# Patient Record
Sex: MALE | HISPANIC OR LATINO | Employment: STUDENT | URBAN - METROPOLITAN AREA
[De-identification: names, ages, dates, MRNs, and addresses within clinical notes are randomized per-mention and may not be internally consistent; named-entity substitution may affect disease eponyms.]

---

## 2017-01-31 PROBLEM — M25.569 KNEE PAIN: Status: ACTIVE | Noted: 2017-01-31

## 2017-01-31 PROBLEM — F90.9 ADULT ATTENTION DEFICIT HYPERACTIVITY DISORDER: Status: ACTIVE | Noted: 2017-01-31

## 2017-01-31 PROBLEM — F41.9 ANXIETY: Status: ACTIVE | Noted: 2017-01-31

## 2017-01-31 PROBLEM — G43.909 HEADACHE, MIGRAINE: Status: ACTIVE | Noted: 2017-01-31

## 2017-01-31 PROBLEM — F32.A DEPRESSION: Status: ACTIVE | Noted: 2017-01-31

## 2017-02-02 ENCOUNTER — OFFICE VISIT (OUTPATIENT)
Dept: INTERNAL MEDICINE | Facility: CLINIC | Age: 22
End: 2017-02-02

## 2017-02-02 VITALS
TEMPERATURE: 98.5 F | DIASTOLIC BLOOD PRESSURE: 78 MMHG | OXYGEN SATURATION: 98 % | HEIGHT: 66 IN | HEART RATE: 61 BPM | SYSTOLIC BLOOD PRESSURE: 118 MMHG | BODY MASS INDEX: 28.03 KG/M2 | WEIGHT: 174.4 LBS

## 2017-02-02 DIAGNOSIS — S43.004A SHOULDER DISLOCATION, RIGHT, INITIAL ENCOUNTER: Primary | ICD-10-CM

## 2017-02-02 PROCEDURE — 99213 OFFICE O/P EST LOW 20 MIN: CPT | Performed by: FAMILY MEDICINE

## 2017-02-02 NOTE — PROGRESS NOTES
"Manuel Baltazar is a 21 y.o. male.     Chief Complaint   Patient presents with   • Shoulder Pain     Right shoulder injury slipped upstairs and dislocated shoulder then popped back in lace.  2 months ago.  Usually hurts in morning when waking up it subluxes       Vitals:    02/02/17 0806   BP: 118/78   Pulse: 61   Temp: 98.5 °F (36.9 °C)   SpO2: 98%   Weight: 174 lb 6.4 oz (79.1 kg)   Height: 66\" (167.6 cm)       Shoulder Injury    The incident occurred at school (stairs outside at ). The right shoulder is affected. The incident occurred more than 1 week ago (2 months ago). The injury mechanism was a fall. The quality of the pain is described as aching and shooting. The pain does not radiate. The pain is at a severity of 8/10 (2 when not dislocated and 8 when dislocated. ). The pain is moderate. Pertinent negatives include no chest pain, muscle weakness, numbness or tingling. The symptoms are aggravated by overhead lifting. He has tried ice and rest for the symptoms. The treatment provided mild relief.      Pt reports that he had bucket handle tear of left knee 2 years ago. He had surgery on knee which is doing well.  Pt was climbing up stairs and slipped and caught himself and dislocated and relocated right shoulder. Pt had labral and biceps repair on the right shoulder in the past 2011 in [place].   Pt is unable to do overhead work. Pt states that he will sleep with arms overhead and right shoulder will sublux.      The following portions of the patient's history were reviewed and updated as appropriate: allergies, current medications, past family history, past medical history, past social history, past surgical history and problem list.    Past Medical History   Diagnosis Date   • ADHD (attention deficit hyperactivity disorder)    • Anesthesia complication 2015     wakes up early and combative   • Anxiety    • Chronic headaches    • Depression    • H/O complete eye exam 04/2012   • History of " dental examination    • Knee pain        Past Surgical History   Procedure Laterality Date   • Knee surgery Left 2015     summer of 2015-meniscus (bucket tear)   • Shoulder surgery Right 2011     labral SLAP and biceps repair   • [place] tooth extraction         Family History   Problem Relation Age of Onset   • Thyroid disease Mother    • ADD / ADHD Father    • ADD / ADHD Brother    • Diabetes Maternal Grandfather    • Diabetes Paternal Grandfather        Social History     Social History   • Marital status: Single     Spouse name: N/A   • Number of children: N/A   • Years of education: N/A     Occupational History   • Not on file.     Social History Main Topics   • Smoking status: Never Smoker   • Smokeless tobacco: Never Used   • Alcohol use Yes      Comment: rarely--special occasion   • Drug use: No   • Sexual activity: Yes     Birth control/ protection: None     Other Topics Concern   • Not on file     Social History Narrative   • No narrative on file     Pt has allergy to unknown medication that cause whelps on arm.Pt has records at home. This was done at .   Pt wakes up agitated from anesthesia.   Review of Systems   Constitutional: Negative.  Negative for chills, diaphoresis, fatigue and fever.   HENT: Negative.  Negative for ear pain, nosebleeds, postnasal drip, rhinorrhea, sinus pressure, sneezing and sore throat.    Eyes: Negative.  Negative for redness and itching.   Respiratory: Negative.  Negative for cough and wheezing.    Cardiovascular: Negative.  Negative for chest pain and palpitations.   Gastrointestinal: Negative.  Negative for abdominal pain, blood in stool, constipation, diarrhea, nausea and vomiting.   Endocrine: Negative.  Negative for cold intolerance and heat intolerance.   Genitourinary: Negative.  Negative for dysuria, frequency and urgency.   Musculoskeletal: Positive for arthralgias. Negative for back pain and neck pain.   Skin: Negative.  Negative for color change and rash.    Allergic/Immunologic: Negative.  Negative for environmental allergies.   Neurological: Negative.  Negative for dizziness, tingling, syncope, weakness, numbness and headaches.   Hematological: Negative.  Negative for adenopathy. Does not bruise/bleed easily.   Psychiatric/Behavioral: Negative.  Negative for dysphoric mood. The patient is not nervous/anxious.         ADHD is currently stable off of meds.       Objective   Physical Exam   Constitutional: He is oriented to person, place, and time. He appears well-developed.   HENT:   Head: Normocephalic.   Right Ear: External ear normal.   Left Ear: External ear normal.   Nose: Nose normal.   Mouth/Throat: Oropharynx is clear and moist.   Eyes: Conjunctivae and EOM are normal. Pupils are equal, round, and reactive to light.   Neck: Normal range of motion. Neck supple.   Cardiovascular: Normal rate and regular rhythm.    No murmur heard.  Pulmonary/Chest: Effort normal and breath sounds normal.   Abdominal: Soft. Bowel sounds are normal.   Musculoskeletal:        Right shoulder: He exhibits decreased range of motion, tenderness, crepitus and decreased strength.        Arms:  Neurological: He is alert and oriented to person, place, and time.   Skin: Skin is warm and dry.   Psychiatric: He has a normal mood and affect. His behavior is normal.   Nursing note and vitals reviewed.      Assessment/Plan   Lizzy was seen today for shoulder pain.    Diagnoses and all orders for this visit:    Shoulder dislocation, right, initial encounter  -     Ambulatory Referral to Orthopedic Surgery  -     XR Shoulder 2+ View Right; Future      Pt declines flu shot           No current outpatient prescriptions on file.    Return in about 4 weeks (around 3/2/2017), or if symptoms worsen or fail to improve, for Recheck.

## 2017-03-09 ENCOUNTER — OFFICE VISIT (OUTPATIENT)
Dept: INTERNAL MEDICINE | Facility: CLINIC | Age: 22
End: 2017-03-09

## 2017-03-09 VITALS
BODY MASS INDEX: 28.77 KG/M2 | SYSTOLIC BLOOD PRESSURE: 116 MMHG | DIASTOLIC BLOOD PRESSURE: 76 MMHG | HEART RATE: 63 BPM | HEIGHT: 66 IN | OXYGEN SATURATION: 98 % | WEIGHT: 179 LBS | TEMPERATURE: 97.6 F

## 2017-03-09 DIAGNOSIS — M54.6 CHRONIC BILATERAL THORACIC BACK PAIN: Primary | ICD-10-CM

## 2017-03-09 DIAGNOSIS — G89.29 CHRONIC BILATERAL THORACIC BACK PAIN: Primary | ICD-10-CM

## 2017-03-09 DIAGNOSIS — S49.91XD RIGHT SHOULDER INJURY, SUBSEQUENT ENCOUNTER: ICD-10-CM

## 2017-03-09 PROCEDURE — 99213 OFFICE O/P EST LOW 20 MIN: CPT | Performed by: FAMILY MEDICINE

## 2017-03-09 NOTE — PROGRESS NOTES
"Manuel Baltazar is a 22 y.o. male.     Chief Complaint   Patient presents with   • dislocated shoulder     has surgery scheduled at [facility] on 4/10/17       Vitals:    03/09/17 0832   BP: 116/76   Pulse: 63   Temp: 97.6 °F (36.4 °C)   SpO2: 98%   Weight: 179 lb (81.2 kg)   Height: 66\" (167.6 cm)       Shoulder Injury    The incident occurred at school. The right shoulder is affected. Incident onset: 12/2016. The injury mechanism was a fall (with anterior dislocation). The quality of the pain is described as aching. The pain does not radiate. The pain is at a severity of 3/10. The pain is mild. Associated symptoms include muscle weakness. Pertinent negatives include no chest pain, numbness or tingling. The symptoms are aggravated by overhead lifting, movement and palpation. He has tried non-weight bearing and rest for the symptoms. The treatment provided no relief.   Back Pain   This is a chronic problem. The current episode started more than 1 month ago. The problem occurs constantly. The problem is unchanged. The pain is present in the thoracic spine. The quality of the pain is described as aching. The pain does not radiate. The pain is at a severity of 4/10. The pain is moderate. The pain is worse during the night. The symptoms are aggravated by bending and sitting. Pertinent negatives include no abdominal pain, bladder incontinence, bowel incontinence, chest pain, dysuria, fever, headaches, leg pain, numbness, paresis, paresthesias, pelvic pain, perianal numbness, tingling, weakness or weight loss. Risk factors include recent trauma. Treatments tried: rest. The treatment provided mild relief.      Pt here for f/u of right shoulder dislocation with slap tear.  Pt will have surgery 4/10/17 at [facility]. Pt has seen Ortho at   Pt would like back checked, he has a hump in back that is sore. Pt has had for several months.   Pt has been rolling his shoulders forward for several months. "     The following portions of the patient's history were reviewed and updated as appropriate: allergies, current medications, past family history, past medical history, past social history, past surgical history and problem list.    Past Medical History   Diagnosis Date   • ADHD (attention deficit hyperactivity disorder)    • Anesthesia complication 2015     wakes up early and combative   • Anxiety    • Chronic headaches    • Depression    • H/O complete eye exam 04/2012   • History of dental examination    • Knee pain        Past Surgical History   Procedure Laterality Date   • Knee surgery Left 2015     summer of 2015-meniscus (bucket tear)   • Shoulder surgery Right 2011     labral SLAP and biceps repair   • [place] tooth extraction         Allergies   Allergen Reactions   • Demerol [Meperidine]        Social History     Social History   • Marital status: Single     Spouse name: N/A   • Number of children: N/A   • Years of education: N/A     Occupational History   • Not on file.     Social History Main Topics   • Smoking status: Never Smoker   • Smokeless tobacco: Never Used   • Alcohol use Yes      Comment: rarely--special occasion   • Drug use: No   • Sexual activity: Yes     Birth control/ protection: None     Other Topics Concern   • Not on file     Social History Narrative       Family History   Problem Relation Age of Onset   • Thyroid disease Mother    • ADD / ADHD Father    • ADD / ADHD Brother    • Diabetes Maternal Grandfather    • Diabetes Paternal Grandfather        Review of Systems   Constitutional: Negative.  Negative for chills, diaphoresis, fatigue, fever and weight loss.   HENT: Negative.  Negative for ear pain, postnasal drip, rhinorrhea, sinus pressure, sneezing and sore throat.    Eyes: Negative.  Negative for redness and itching.   Respiratory: Negative.  Negative for cough, shortness of breath and wheezing.    Cardiovascular: Negative.  Negative for chest pain and palpitations.    Gastrointestinal: Negative.  Negative for abdominal pain, bowel incontinence, constipation, diarrhea, nausea and vomiting.   Endocrine: Negative.  Negative for cold intolerance and heat intolerance.   Genitourinary: Negative.  Negative for bladder incontinence, dysuria, frequency, pelvic pain and urgency.   Musculoskeletal: Positive for arthralgias, back pain and myalgias.   Skin: Negative.  Negative for color change and rash.   Allergic/Immunologic: Negative for environmental allergies.   Neurological: Negative.  Negative for dizziness, tingling, weakness, light-headedness, numbness, headaches and paresthesias.   Hematological: Negative for adenopathy. Does not bruise/bleed easily.   Psychiatric/Behavioral: Negative for dysphoric mood. The patient is not nervous/anxious.        Objective   Physical Exam   Constitutional: He is oriented to person, place, and time. He appears well-developed.   HENT:   Head: Normocephalic.   Right Ear: External ear normal.   Left Ear: External ear normal.   Nose: Nose normal.   Eyes: Conjunctivae and EOM are normal. Pupils are equal, round, and reactive to light.   Neck: Normal range of motion. Neck supple.   Cardiovascular: Normal rate and regular rhythm.    No murmur heard.  Pulmonary/Chest: Effort normal and breath sounds normal.   Musculoskeletal: He exhibits tenderness.        Right shoulder: He exhibits decreased range of motion and tenderness.        Thoracic back: He exhibits tenderness and spasm.        Back:    Neurological: He is alert and oriented to person, place, and time.   Skin: Skin is warm and dry.   Psychiatric: He has a normal mood and affect. His behavior is normal.   Nursing note and vitals reviewed.      Assessment/Plan   Lizzy was seen today for dislocated shoulder.    Diagnoses and all orders for this visit:    Chronic bilateral thoracic back pain  -     XR Spine Thoracic 3 View    Right shoulder injury, subsequent encounter      Pt declined pain  medication         No current outpatient prescriptions on file.    Return in about 6 weeks (around 4/20/2017), or if symptoms worsen or fail to improve, for Recheck.

## 2017-04-25 ENCOUNTER — OFFICE VISIT (OUTPATIENT)
Dept: INTERNAL MEDICINE | Facility: CLINIC | Age: 22
End: 2017-04-25

## 2017-04-25 VITALS
HEIGHT: 66 IN | WEIGHT: 182.2 LBS | HEART RATE: 54 BPM | SYSTOLIC BLOOD PRESSURE: 110 MMHG | TEMPERATURE: 98.3 F | OXYGEN SATURATION: 98 % | DIASTOLIC BLOOD PRESSURE: 74 MMHG | BODY MASS INDEX: 29.28 KG/M2

## 2017-04-25 DIAGNOSIS — G89.29 CHRONIC BILATERAL THORACIC BACK PAIN: Primary | ICD-10-CM

## 2017-04-25 DIAGNOSIS — M54.6 CHRONIC BILATERAL THORACIC BACK PAIN: Primary | ICD-10-CM

## 2017-04-25 PROCEDURE — 99213 OFFICE O/P EST LOW 20 MIN: CPT | Performed by: FAMILY MEDICINE

## 2017-04-25 RX ORDER — OXYCODONE HYDROCHLORIDE AND ACETAMINOPHEN 5; 325 MG/1; MG/1
TABLET ORAL
COMMUNITY
Start: 2017-04-19 | End: 2018-07-20

## 2017-04-25 NOTE — PROGRESS NOTES
"Manuel Baltazar is a 22 y.o. male.     Chief Complaint   Patient presents with   • Shoulder Injury     6 week follow up had surgery 4/19.  Dr George   • Back Pain       Visit Vitals   • /74   • Pulse 54   • Temp 98.3 °F (36.8 °C)   • Ht 66\" (167.6 cm)   • Wt 182 lb 3.2 oz (82.6 kg)   • SpO2 98%   • BMI 29.41 kg/m2       Back Pain   This is a recurrent problem. The current episode started more than 1 year ago. The problem occurs intermittently. The problem is unchanged. The pain is present in the thoracic spine. The quality of the pain is described as cramping and shooting. The pain does not radiate. The pain is at a severity of 4/10. The pain is mild. The pain is worse during the night. The symptoms are aggravated by sitting. Pertinent negatives include no abdominal pain, bladder incontinence, bowel incontinence, chest pain, dysuria, fever, headaches, leg pain, numbness, paresis, paresthesias, pelvic pain, perianal numbness, tingling, weakness or weight loss. Risk factors: recent surgery. He has tried analgesics for the symptoms.        Pt had shoulder surgery 4/19/17 on the right- labral repair. Pt is in sling with offset pad on abdomen.     Pt's upper back is tight. Pt has not gotten back xray.  Pt did well post anesthesia.   Pt will be in sling for another month then PT for right shoulder.   Shoulder -right feels better than before the surgery.   The following portions of the patient's history were reviewed and updated as appropriate: allergies, current medications, past family history, past medical history, past social history, past surgical history and problem list.     Past Medical History:   Diagnosis Date   • ADHD (attention deficit hyperactivity disorder)    • Anesthesia complication 2015    wakes up early and combative   • Anxiety    • Chronic headaches    • Depression    • H/O complete eye exam 04/2012   • History of dental examination    • Knee pain        Past Surgical History: "   Procedure Laterality Date   • KNEE SURGERY Left 2015    summer of 2015-meniscus (bucket tear)   • SHOULDER SURGERY Right 2011    labral SLAP and biceps repair   • SHOULDER SURGERY Right 04/19/2017    labral repair   • WISDOM TOOTH EXTRACTION         Allergies   Allergen Reactions   • Demerol [Meperidine]        Family History   Problem Relation Age of Onset   • Thyroid disease Mother    • ADD / ADHD Father    • ADD / ADHD Brother    • Diabetes Maternal Grandfather    • Diabetes Paternal Grandfather        Social History     Social History   • Marital status: Single     Spouse name: N/A   • Number of children: N/A   • Years of education: N/A     Occupational History   • Not on file.     Social History Main Topics   • Smoking status: Never Smoker   • Smokeless tobacco: Never Used   • Alcohol use Yes      Comment: rarely--special occasion   • Drug use: No   • Sexual activity: Yes     Birth control/ protection: None     Other Topics Concern   • Not on file     Social History Narrative         Review of Systems   Constitutional: Negative.  Negative for chills, diaphoresis, fatigue, fever and weight loss.   HENT: Negative.  Negative for ear pain, postnasal drip, rhinorrhea, sinus pressure, sneezing, sore throat and tinnitus.    Eyes: Negative.  Negative for redness and itching.   Respiratory: Negative.  Negative for cough, shortness of breath and wheezing.    Cardiovascular: Negative.  Negative for chest pain and palpitations.   Gastrointestinal: Negative.  Negative for abdominal pain, blood in stool, bowel incontinence, constipation, diarrhea, nausea and vomiting.   Endocrine: Negative.  Negative for cold intolerance and heat intolerance.   Genitourinary: Negative.  Negative for bladder incontinence, dysuria, frequency, pelvic pain and urgency.   Musculoskeletal: Positive for arthralgias and back pain.   Skin: Negative.  Negative for color change and rash.   Allergic/Immunologic: Negative.  Negative for environmental  allergies.   Neurological: Negative.  Negative for dizziness, tingling, weakness, light-headedness, numbness, headaches and paresthesias.   Hematological: Negative.  Negative for adenopathy. Does not bruise/bleed easily.   Psychiatric/Behavioral: Negative.  Negative for dysphoric mood. The patient is not nervous/anxious.        Objective   Physical Exam   Constitutional: He is oriented to person, place, and time. He appears well-developed.   HENT:   Head: Normocephalic.   Right Ear: External ear normal.   Left Ear: External ear normal.   Nose: Nose normal.   Eyes: Conjunctivae and EOM are normal. Pupils are equal, round, and reactive to light.   Neck: Normal range of motion. Neck supple.   Cardiovascular: Normal rate and regular rhythm.    No murmur heard.  Pulmonary/Chest: Effort normal and breath sounds normal.   Musculoskeletal: Normal range of motion.        Thoracic back: He exhibits tenderness and spasm. He exhibits no bony tenderness.   Right arm/shoulder in sling.   Neurological: He is alert and oriented to person, place, and time.   Skin: Skin is warm and dry.   Psychiatric: He has a normal mood and affect. His behavior is normal.   Nursing note and vitals reviewed.      Assessment/Plan   Lizzy was seen today for shoulder injury and back pain.    Diagnoses and all orders for this visit:    Chronic bilateral thoracic back pain  -     XR Spine Thoracic 3 View        Pt to call when ready for PT, will do at same facility that he will be getting PT for shoulder  Explained to patient that xray orders are in the computer. If pt going to a [facility] facility for plain films, a order slip and scheduling are not needed. If going to an outside facility an order slip is needed and some facilities require scheduling for plain films.            Current Outpatient Prescriptions:   •  oxyCODONE-acetaminophen (PERCOCET) 5-325 MG per tablet, , Disp: , Rfl:     Return in about 2 months (around 6/25/2017), or if symptoms  worsen or fail to improve, for Recheck.

## 2017-05-09 ENCOUNTER — HOSPITAL ENCOUNTER (OUTPATIENT)
Dept: GENERAL RADIOLOGY | Facility: HOSPITAL | Age: 22
Discharge: HOME | End: 2017-05-09
Admitting: FAMILY MEDICINE

## 2017-05-09 PROCEDURE — 72072 X-RAY EXAM THORAC SPINE 3VWS: CPT

## 2018-04-14 ENCOUNTER — LAB REQUISITION (OUTPATIENT)
Dept: LAB | Facility: HOSPITAL | Age: 23
End: 2018-04-14

## 2018-04-14 DIAGNOSIS — Z00.00 ROUTINE GENERAL MEDICAL EXAMINATION AT A HEALTH CARE FACILITY: ICD-10-CM

## 2018-04-14 LAB
ALBUMIN SERPL-MCNC: 5 G/DL (ref 3.2–4.8)
ALBUMIN/GLOB SERPL: 2.4 G/DL (ref 1.5–2.5)
ALP SERPL-CCNC: 66 U/L (ref 25–100)
ALT SERPL W P-5'-P-CCNC: 5 U/L (ref 7–40)
ANION GAP SERPL CALCULATED.3IONS-SCNC: 14 MMOL/L (ref 3–11)
ARTICHOKE IGE QN: 82 MG/DL (ref 0–130)
AST SERPL-CCNC: 21 U/L (ref 0–33)
BASOPHILS # BLD AUTO: 0.03 10*3/MM3 (ref 0–0.2)
BASOPHILS NFR BLD AUTO: 0.5 % (ref 0–1)
BILIRUB SERPL-MCNC: 0.9 MG/DL (ref 0.3–1.2)
BUN BLD-MCNC: 13 MG/DL (ref 9–23)
BUN/CREAT SERPL: 11.8 (ref 7–25)
CALCIUM SPEC-SCNC: 9.9 MG/DL (ref 8.7–10.4)
CHLORIDE SERPL-SCNC: 101 MMOL/L (ref 99–109)
CHOLEST SERPL-MCNC: 141 MG/DL (ref 0–200)
CO2 SERPL-SCNC: 28 MMOL/L (ref 20–31)
CREAT BLD-MCNC: 1.1 MG/DL (ref 0.6–1.3)
DEPRECATED RDW RBC AUTO: 37.9 FL (ref 37–54)
EOSINOPHIL # BLD AUTO: 0.08 10*3/MM3 (ref 0–0.3)
EOSINOPHIL NFR BLD AUTO: 1.5 % (ref 0–3)
ERYTHROCYTE [DISTWIDTH] IN BLOOD BY AUTOMATED COUNT: 11.9 % (ref 11.3–14.5)
GFR SERPL CREATININE-BSD FRML MDRD: 82 ML/MIN/1.73
GFR SERPL CREATININE-BSD FRML MDRD: 99 ML/MIN/1.73
GLOBULIN UR ELPH-MCNC: 2.1 GM/DL
GLUCOSE BLD-MCNC: 88 MG/DL (ref 70–100)
HCT VFR BLD AUTO: 43.3 % (ref 38.9–50.9)
HDLC SERPL-MCNC: 46 MG/DL (ref 40–60)
HGB BLD-MCNC: 14.4 G/DL (ref 13.1–17.5)
IMM GRANULOCYTES # BLD: 0.01 10*3/MM3 (ref 0–0.03)
IMM GRANULOCYTES NFR BLD: 0.2 % (ref 0–0.6)
LYMPHOCYTES # BLD AUTO: 1.06 10*3/MM3 (ref 0.6–4.8)
LYMPHOCYTES NFR BLD AUTO: 19.3 % (ref 24–44)
MCH RBC QN AUTO: 28.9 PG (ref 27–31)
MCHC RBC AUTO-ENTMCNC: 33.3 G/DL (ref 32–36)
MCV RBC AUTO: 86.9 FL (ref 80–99)
MONOCYTES # BLD AUTO: 0.61 10*3/MM3 (ref 0–1)
MONOCYTES NFR BLD AUTO: 11.1 % (ref 0–12)
NEUTROPHILS # BLD AUTO: 3.69 10*3/MM3 (ref 1.5–8.3)
NEUTROPHILS NFR BLD AUTO: 67.4 % (ref 41–71)
PLATELET # BLD AUTO: 202 10*3/MM3 (ref 150–450)
PMV BLD AUTO: 10.6 FL (ref 6–12)
POTASSIUM BLD-SCNC: 3.9 MMOL/L (ref 3.5–5.5)
PROT SERPL-MCNC: 7.1 G/DL (ref 5.7–8.2)
RBC # BLD AUTO: 4.98 10*6/MM3 (ref 4.2–5.76)
SODIUM BLD-SCNC: 143 MMOL/L (ref 132–146)
TRIGL SERPL-MCNC: 80 MG/DL (ref 0–150)
WBC NRBC COR # BLD: 5.48 10*3/MM3 (ref 3.5–10.8)

## 2018-04-14 PROCEDURE — 85025 COMPLETE CBC W/AUTO DIFF WBC: CPT

## 2018-04-14 PROCEDURE — 80061 LIPID PANEL: CPT

## 2018-04-14 PROCEDURE — 80053 COMPREHEN METABOLIC PANEL: CPT

## 2018-06-04 ENCOUNTER — TELEPHONE (OUTPATIENT)
Dept: INTERNAL MEDICINE | Facility: CLINIC | Age: 23
End: 2018-06-04

## 2018-06-04 NOTE — TELEPHONE ENCOUNTER
Delaney says that he had surgery on his shoulder last year, he believes that he has injured it again, he would like a MRI done.please call to let him know what he needs to do.

## 2018-06-06 ENCOUNTER — OFFICE VISIT (OUTPATIENT)
Dept: INTERNAL MEDICINE | Facility: CLINIC | Age: 23
End: 2018-06-06

## 2018-06-06 VITALS
HEIGHT: 66 IN | DIASTOLIC BLOOD PRESSURE: 70 MMHG | TEMPERATURE: 98.9 F | HEART RATE: 59 BPM | OXYGEN SATURATION: 98 % | SYSTOLIC BLOOD PRESSURE: 120 MMHG | BODY MASS INDEX: 28.83 KG/M2 | WEIGHT: 179.4 LBS

## 2018-06-06 DIAGNOSIS — Z53.21 PATIENT LEFT BEFORE EVALUATION BY PHYSICIAN: Primary | ICD-10-CM

## 2018-06-06 RX ORDER — MIRTAZAPINE 15 MG/1
TABLET, FILM COATED ORAL
COMMUNITY
Start: 2018-05-14

## 2018-07-20 ENCOUNTER — OFFICE VISIT (OUTPATIENT)
Dept: INTERNAL MEDICINE | Facility: CLINIC | Age: 23
End: 2018-07-20

## 2018-07-20 VITALS
RESPIRATION RATE: 16 BRPM | HEART RATE: 77 BPM | DIASTOLIC BLOOD PRESSURE: 68 MMHG | SYSTOLIC BLOOD PRESSURE: 104 MMHG | OXYGEN SATURATION: 99 % | WEIGHT: 175 LBS | BODY MASS INDEX: 28.12 KG/M2 | HEIGHT: 66 IN | TEMPERATURE: 98.1 F

## 2018-07-20 DIAGNOSIS — M25.311 INSTABILITY OF RIGHT SHOULDER JOINT: ICD-10-CM

## 2018-07-20 DIAGNOSIS — M25.511 CHRONIC RIGHT SHOULDER PAIN: Primary | ICD-10-CM

## 2018-07-20 DIAGNOSIS — G89.29 CHRONIC RIGHT SHOULDER PAIN: Primary | ICD-10-CM

## 2018-07-20 PROCEDURE — 99213 OFFICE O/P EST LOW 20 MIN: CPT | Performed by: FAMILY MEDICINE

## 2018-07-20 NOTE — PROGRESS NOTES
"Manuel Baltazar is a 23 y.o. male.     Chief Complaint   Patient presents with   • Shoulder Pain     R shoulder pain, 2 previous surgeries       Visit Vitals  /68   Pulse 77   Temp 98.1 °F (36.7 °C) (Temporal Artery )   Resp 16   Ht 167.6 cm (66\")   Wt 79.4 kg (175 lb)   SpO2 99%   BMI 28.25 kg/m²         Upper Extremity Issue   This is a recurrent problem. The current episode started more than 1 month ago (4 months ago). The problem occurs constantly. The problem has been unchanged. Associated symptoms include arthralgias (right shoulder). Pertinent negatives include no abdominal pain, anorexia, change in bowel habit, chest pain, chills, congestion, coughing, diaphoresis, fatigue, fever, headaches, joint swelling, myalgias, nausea, neck pain, numbness, rash, sore throat, swollen glands, urinary symptoms, vertigo, visual change, vomiting or weakness. Exacerbated by: reaching. Treatments tried: previous surgery x 2, no new treatment. The treatment provided no relief.        Pt has had 2 right shoulder surgeries at  and was doing ok until wrestling with his brother and right shoulder popped out of joint. Now right shoulder feels unstable when he reaches over head      The following portions of the patient's history were reviewed and updated as appropriate: allergies, current medications, past family history, past medical history, past social history, past surgical history and problem list.    Past Medical History:   Diagnosis Date   • ADHD (attention deficit hyperactivity disorder)    • Anesthesia complication 2015    wakes up early and combative   • Anxiety    • Chronic headaches    • Depression    • H/O complete eye exam 04/2012   • History of dental examination    • Knee pain       Past Surgical History:   Procedure Laterality Date   • KNEE SURGERY Left 2015    summer of 2015-meniscus (bucket tear)   • SHOULDER SURGERY Right 2011    labral SLAP and biceps repair   • SHOULDER SURGERY Right " 04/19/2017    labral repair   • WISDOM TOOTH EXTRACTION        Family History   Problem Relation Age of Onset   • Thyroid disease Mother    • ADD / ADHD Father    • ADD / ADHD Brother    • Diabetes Maternal Grandfather    • Diabetes Paternal Grandfather       Social History     Social History   • Marital status: Single     Spouse name: N/A   • Number of children: N/A   • Years of education: N/A     Occupational History   • Not on file.     Social History Main Topics   • Smoking status: Never Smoker   • Smokeless tobacco: Never Used   • Alcohol use Yes      Comment: rarely--special occasion   • Drug use: No   • Sexual activity: Yes     Birth control/ protection: None     Other Topics Concern   • Not on file     Social History Narrative   • No narrative on file        Review of Systems   Constitutional: Negative.  Negative for chills, diaphoresis, fatigue and fever.   HENT: Negative.  Negative for congestion, ear pain, nosebleeds, postnasal drip, rhinorrhea, sinus pressure, sneezing and sore throat.    Eyes: Negative.  Negative for redness and itching.   Respiratory: Negative.  Negative for cough, shortness of breath and wheezing.    Cardiovascular: Negative.  Negative for chest pain and palpitations.   Gastrointestinal: Negative.  Negative for abdominal pain, anorexia, change in bowel habit, constipation, diarrhea, nausea and vomiting.   Endocrine: Negative.  Negative for cold intolerance and heat intolerance.   Genitourinary: Negative.  Negative for dysuria, frequency, hematuria and urgency.   Musculoskeletal: Positive for arthralgias (right shoulder). Negative for back pain, joint swelling, myalgias and neck pain.   Skin: Negative.  Negative for color change and rash.   Allergic/Immunologic: Negative.  Negative for environmental allergies.   Neurological: Negative.  Negative for dizziness, vertigo, syncope, weakness, light-headedness, numbness and headaches.   Hematological: Negative.  Negative for adenopathy. Does  not bruise/bleed easily.   Psychiatric/Behavioral: Negative.  Negative for dysphoric mood. The patient is not nervous/anxious.        Objective   Physical Exam   Constitutional: He is oriented to person, place, and time. He appears well-developed.   HENT:   Head: Normocephalic.   Right Ear: External ear normal.   Left Ear: External ear normal.   Nose: Nose normal.   Eyes: Pupils are equal, round, and reactive to light. Conjunctivae, EOM and lids are normal.   Neck: Trachea normal and normal range of motion. Neck supple. No thyroid mass and no thyromegaly present.   Cardiovascular: Normal rate and regular rhythm.    No murmur heard.  Pulmonary/Chest: Effort normal and breath sounds normal. No respiratory distress. He has no decreased breath sounds. He has no wheezes. He has no rhonchi. He has no rales. He exhibits no tenderness.   Abdominal: Soft. Bowel sounds are normal. There is no tenderness.   Musculoskeletal:        Right shoulder: He exhibits decreased range of motion (mild), tenderness and crepitus.   Neurological: He is alert and oriented to person, place, and time.   Skin: Skin is warm and dry.   Psychiatric: He has a normal mood and affect. His behavior is normal.   Nursing note and vitals reviewed.      Assessment/Plan   Lizzy was seen today for shoulder pain.    Diagnoses and all orders for this visit:    Chronic right shoulder pain  -     Ambulatory Referral to Orthopedic Surgery  -     XR Shoulder 2+ View Right; Future    Instability of right shoulder joint  -     Ambulatory Referral to Orthopedic Surgery  -     XR Shoulder 2+ View Right; Future                   Current Outpatient Prescriptions:   •  mirtazapine (REMERON) 15 MG tablet, , Disp: , Rfl:     Return if symptoms worsen or fail to improve, for Recheck.